# Patient Record
Sex: MALE | Race: WHITE | NOT HISPANIC OR LATINO | Employment: FULL TIME | URBAN - METROPOLITAN AREA
[De-identification: names, ages, dates, MRNs, and addresses within clinical notes are randomized per-mention and may not be internally consistent; named-entity substitution may affect disease eponyms.]

---

## 2023-11-06 ENCOUNTER — PHARMACY VISIT (OUTPATIENT)
Dept: PHARMACY | Facility: CLINIC | Age: 53
End: 2023-11-06
Payer: MEDICAID

## 2023-11-06 PROCEDURE — RXMED WILLOW AMBULATORY MEDICATION CHARGE

## 2023-12-14 ENCOUNTER — OFFICE VISIT (OUTPATIENT)
Dept: CARDIOLOGY | Facility: CLINIC | Age: 53
End: 2023-12-14
Payer: COMMERCIAL

## 2023-12-14 VITALS
DIASTOLIC BLOOD PRESSURE: 96 MMHG | SYSTOLIC BLOOD PRESSURE: 150 MMHG | WEIGHT: 308 LBS | OXYGEN SATURATION: 92 % | BODY MASS INDEX: 42.96 KG/M2 | HEART RATE: 85 BPM

## 2023-12-14 DIAGNOSIS — I82.432 ACUTE DEEP VEIN THROMBOSIS (DVT) OF POPLITEAL VEIN OF LEFT LOWER EXTREMITY (MULTI): Primary | ICD-10-CM

## 2023-12-14 PROCEDURE — 99214 OFFICE O/P EST MOD 30 MIN: CPT | Performed by: INTERNAL MEDICINE

## 2023-12-14 PROCEDURE — 1036F TOBACCO NON-USER: CPT | Performed by: INTERNAL MEDICINE

## 2023-12-14 RX ORDER — OMEPRAZOLE 20 MG/1
20 TABLET, DELAYED RELEASE ORAL 2 TIMES DAILY
COMMUNITY

## 2023-12-14 NOTE — PROGRESS NOTES
Chief Complaint:   DVT     History of Present Illness:    Valdez Madera is a 52 y/o man who follows up for unprovoked proximal DVT and PE in February 2022. He is on long-term anticoagulation with apixaban.     Recently developed worsening intermittent leg swelling and tightness after doing work where he was on his feet and moving up and down the stairs quite a lot. Since then notices that toward the end of the day he will have pretty significant indentation from where his socks dig into his leg. Swelling comes down overnight.    He denies chest pain and shortness of breath.    He denies bleeding including epistaxis, gingival bleeding, hemoptysis, hematemesis, hematochezia, melena, and hematuria.    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 He is overdue for screening colonoscopy.        Last Recorded Vitals:  Vitals:    12/14/23 0801   BP: (!) 150/96   BP Location: Left arm   Pulse: 85   SpO2: 92%   Weight: 140 kg (308 lb)           Social History:  He reports that he has never smoked. He has been exposed to tobacco smoke. He has never used smokeless tobacco. No history on file for alcohol use and drug use.        Outpatient Medications:  Current Outpatient Medications   Medication Instructions    apixaban (Eliquis) 5 mg tablet TAKE 1 TABLET BY MOUTH TWO TIMES A DAY    omeprazole OTC (PRILOSEC OTC) 20 mg, oral, 2 times daily, Do not crush, chew, or split.       Physical Exam:  No distress  No JVD or carotid bruits  Lungs clear bilaterally  Heart regular and without murmurs  Abdomen soft and non-tender  Trace left leg swelling  Pulses intact       Last Labs:  CBC -  Lab Results   Component Value Date    WBC 10.3 02/16/2022    HGB 12.9 (L) 02/16/2022    HCT 39.6 (L) 02/16/2022    MCV 86.5 02/16/2022     02/16/2022       CMP -  Lab Results   Component Value Date    CALCIUM 9.0 02/16/2022    PROT 6.8 02/11/2022    ALBUMIN 4.1 02/11/2022    ALBUMIN 4.4  "12/07/2021    AST 15 02/11/2022    ALT 15 02/11/2022    ALKPHOS 73 02/11/2022    BILITOT 1.0 02/11/2022       LIPID PANEL -   Lab Results   Component Value Date    CHOL 141 02/15/2022    TRIG 78 02/15/2022    HDL 46 02/15/2022    CHHDL 3.1 02/15/2022    LDLF 94 02/11/2022    VLDL 18 02/11/2022       RENAL FUNCTION PANEL -   Lab Results   Component Value Date    GLUCOSE 118 (H) 02/16/2022     02/16/2022    K 3.6 02/16/2022     02/16/2022    CO2 25 02/16/2022    ANIONGAP 11 02/16/2022    BUN 10 02/16/2022    CREATININE 0.6 02/16/2022    GFRMALE >90 02/11/2022    CALCIUM 9.0 02/16/2022    ALBUMIN 4.1 02/11/2022    ALBUMIN 4.4 12/07/2021        No results found for: \"BNP\", \"HGBA1C\"      Assessment/Plan   Diagnoses and all orders for this visit:  Acute deep vein thrombosis (DVT) of popliteal vein of left lower extremity (CMS/HCC)  -     Vascular US Lower Extremity Venous Duplex Left; Future  I want you to get scheduled for an ultrasound in the next couple of weeks at [facility]. You can call [phone] and select option 2 to schedule. However, I think you probably are having symptoms related to the old blood clot and not to something new.    I would recommend 20-30 mm Hg compression for your socks.    If you go through [employer], make sure you stick with major medical brands like: Juzo, Jobst, Mediven (or sometimes just Medi), Sigvaris.    There are also several websites that deal exclusively in medical-grade compression socks. I like [employer] and [employer].    Make sure you measure the widest part of your calf and the narrowest part of your ankle with a soft tape measure. You might try a sock with a wider band at the calf or a silicone gripper.    Do get scheduled for your colonoscopy. For colonoscopy, you will need to stop the Eliquis in the morning 2 days prior. If they take a biopsy, you can resume it the day after colonoscopy. If they do not take a biopsy, you can resume it that same day " in the evening.    Should you have questions, please do not hesitate to call my office at [phone].         Melvina Granado MD

## 2023-12-14 NOTE — PATIENT INSTRUCTIONS
I want you to get scheduled for an ultrasound in the next couple of weeks at [facility] [place]. You can call [phone] and select option 2 to schedule. However, I think you probably are having symptoms related to the old blood clot and not to something new.    I would recommend 20-30 mm Hg compression for your socks.    If you go through [employer], make sure you stick with major medical brands like: Juzo, Jobst, Mediven (or sometimes just Medi), Sigvaris.    There are also several websites that deal exclusively in medical-grade compression socks. I like [employer] and [employer].    Make sure you measure the widest part of your calf and the narrowest part of your ankle with a soft tape measure. You might try a sock with a wider band at the calf or a silicone gripper.    Do get scheduled for your colonoscopy. For colonoscopy, you will need to stop the Eliquis in the morning 2 days prior. If they take a biopsy, you can resume it the day after colonoscopy. If they do not take a biopsy, you can resume it that same day in the evening.    Should you have questions, please do not hesitate to call my office at [phone].

## 2024-01-03 ENCOUNTER — CLINICAL SUPPORT (OUTPATIENT)
Dept: VASCULAR MEDICINE | Facility: CLINIC | Age: 54
End: 2024-01-03
Payer: COMMERCIAL

## 2024-01-03 DIAGNOSIS — I82.432 ACUTE DEEP VEIN THROMBOSIS (DVT) OF POPLITEAL VEIN OF LEFT LOWER EXTREMITY (MULTI): ICD-10-CM

## 2024-01-03 PROCEDURE — 93971 EXTREMITY STUDY: CPT | Performed by: INTERNAL MEDICINE

## 2024-01-03 PROCEDURE — 93971 EXTREMITY STUDY: CPT

## 2024-01-31 ENCOUNTER — PHARMACY VISIT (OUTPATIENT)
Dept: PHARMACY | Facility: CLINIC | Age: 54
End: 2024-01-31
Payer: MEDICAID

## 2024-01-31 PROCEDURE — RXMED WILLOW AMBULATORY MEDICATION CHARGE

## 2024-03-06 ENCOUNTER — APPOINTMENT (OUTPATIENT)
Dept: CARDIOLOGY | Facility: HOSPITAL | Age: 54
End: 2024-03-06
Payer: COMMERCIAL

## 2024-04-22 ENCOUNTER — OFFICE VISIT (OUTPATIENT)
Dept: PRIMARY CARE | Facility: CLINIC | Age: 54
End: 2024-04-22
Payer: COMMERCIAL

## 2024-04-22 VITALS
SYSTOLIC BLOOD PRESSURE: 130 MMHG | DIASTOLIC BLOOD PRESSURE: 80 MMHG | HEIGHT: 71 IN | BODY MASS INDEX: 42 KG/M2 | WEIGHT: 300 LBS

## 2024-04-22 DIAGNOSIS — Z79.01 CURRENT USE OF LONG TERM ANTICOAGULATION: ICD-10-CM

## 2024-04-22 DIAGNOSIS — K60.2 RECTAL FISSURE: Primary | ICD-10-CM

## 2024-04-22 DIAGNOSIS — K64.9 ACUTE HEMORRHOID: ICD-10-CM

## 2024-04-22 DIAGNOSIS — E66.3 OVERWEIGHT: ICD-10-CM

## 2024-04-22 DIAGNOSIS — K21.9 GASTROESOPHAGEAL REFLUX DISEASE, UNSPECIFIED WHETHER ESOPHAGITIS PRESENT: ICD-10-CM

## 2024-04-22 DIAGNOSIS — Z87.19 HISTORY OF HEMORRHOIDS: ICD-10-CM

## 2024-04-22 DIAGNOSIS — L03.90 CELLULITIS, UNSPECIFIED CELLULITIS SITE: ICD-10-CM

## 2024-04-22 PROCEDURE — 99214 OFFICE O/P EST MOD 30 MIN: CPT | Performed by: INTERNAL MEDICINE

## 2024-04-22 RX ORDER — DOXYCYCLINE 100 MG/1
100 CAPSULE ORAL 2 TIMES DAILY
Qty: 20 CAPSULE | Refills: 0 | Status: SHIPPED | OUTPATIENT
Start: 2024-04-22 | End: 2024-05-02

## 2024-04-22 RX ORDER — BACITRACIN ZINC 500 UNIT/G
OINTMENT (GRAM) TOPICAL 2 TIMES DAILY
Qty: 14 G | Refills: 0 | Status: SHIPPED | OUTPATIENT
Start: 2024-04-22

## 2024-04-22 RX ORDER — AMOXICILLIN AND CLAVULANATE POTASSIUM 875; 125 MG/1; MG/1
875 TABLET, FILM COATED ORAL 2 TIMES DAILY
Qty: 20 TABLET | Refills: 0 | Status: SHIPPED | OUTPATIENT
Start: 2024-04-22 | End: 2024-05-02

## 2024-04-23 ENCOUNTER — OFFICE VISIT (OUTPATIENT)
Dept: SURGERY | Facility: CLINIC | Age: 54
End: 2024-04-23
Payer: COMMERCIAL

## 2024-04-23 VITALS
WEIGHT: 298 LBS | BODY MASS INDEX: 41.56 KG/M2 | SYSTOLIC BLOOD PRESSURE: 127 MMHG | DIASTOLIC BLOOD PRESSURE: 80 MMHG | HEART RATE: 108 BPM | TEMPERATURE: 98.6 F

## 2024-04-23 DIAGNOSIS — K61.0 PERIANAL ABSCESS: Primary | ICD-10-CM

## 2024-04-23 DIAGNOSIS — K64.9 ACUTE HEMORRHOID: ICD-10-CM

## 2024-04-23 PROCEDURE — 99203 OFFICE O/P NEW LOW 30 MIN: CPT | Performed by: SURGERY

## 2024-04-23 PROCEDURE — 99213 OFFICE O/P EST LOW 20 MIN: CPT | Performed by: SURGERY

## 2024-04-23 NOTE — PROGRESS NOTES
History Of Present Illness  Valdez Madera is a 53 y.o. male presents to the office for evaluation of rectal pain, swelling and bleeding.  Referred by Dr. Spivey.  Reports symptoms began approximately month ago discomfort with pressure that slowly progressed prompting an urgent care visit where he was prescribed antibiotics.  Symptoms continued until approximately Sunday there was drainage of bloody and purulent material with a release of his constant pressure.  Since then symptoms have slowly been abating.  Subjective fevers.  Tolerating diet and having soft formed stools.  No prior colonoscopy.  History of Smith's and has been told he needs a surveillance EGDs.  Past Medical History  DVT and PE in February 2022   GERD  Smith's esophagus    Surgical History       Social History  Smoking: Denies  ETOH:  Denies    Family History       Allergies  Patient has no known allergies.      Review of Systems  Constitutional: Negative for fever, chills, anorexia, weight loss, malaise     ENMT: Negative for nasal discharge, congestion, ear pain, mouth pain, throat pain     Respiratory: Negative for cough, hemoptysis, wheezing, shortness of breath     Cardiac: Negative for chest pain, dyspnea on exertion, orthopnea, palpitations, syncope     Gastrointestinal: Negative for nausea, vomiting, diarrhea, constipation, abdominal pain, (+)GERD, Barett's esophagus.    Genitourinary: Negative for discharge, dysuria, flank pain, frequency, hematuria     Musculoskeletal: Negative for decreased ROM, pain, swelling, weakness     Neurological: Negative for dizziness, confusion, headache, seizures, syncope     Psychiatric: Negative for mood changes, anxiety, hallucinations, sleep changes, suicidal ideas     Skin: Negative for mass, pain, itching, rash, ulcer     Endocrine: Negative for heat intolerance, cold intolerance, excessive sweating, polyuria, excess thirst     Hematologic/Lymph: Negative for anemia, bruising, easy bleeding, night  sweats, petechiae, history of DVT/PE or cancer , (+)DVT and PE in February 2022 on ANTICOAGULATION    Allergic/Immunologic: Negative for anaphylaxis, itchy/ teary eyes, itching, sneezing, swelling       Physical Exam  Constitutional:       Appearance: Normal appearance.   HENT:      Head: Normocephalic.   Eyes:      Pupils: Pupils are equal, round, and reactive to light.   Cardiovascular:      Rate and Rhythm: Normal rate.   Pulmonary:      Effort: Pulmonary effort is normal.   Abdominal:      General: Abdomen is flat. Bowel sounds are normal.      Palpations: Abdomen is soft.   Genitourinary:     Comments: Verbal consent was obtained and with chaperone present the patient was placed in prone Kraske position. Perianal skin was examined a fairly deep ulcerated wound in the right posterior quadrant measuring approximately 2 cm in length, well-circumscribed, bed of granulation tissue.  This was tracking a little bit more towards the coccyx than the anus it was about 2 cm away from the anal verge.  No external hemorrhoids identified.  Digital rectal exam revealed normal tone with good squeeze.  No palpable masses appreciated.  Anoscopy deferred   Skin:     General: Skin is warm.   Neurological:      General: No focal deficit present.      Mental Status: He is alert.                 Assessment/Plan   Problem List Items Addressed This Visit             ICD-10-CM       Gastrointestinal and Abdominal    Perianal abscess - Primary K61.0     Other Visit Diagnoses         Codes    Acute hemorrhoid     K64.9        History consistent with acute perianal abscess that underwent spontaneous decompression.  On exam there is granulation tissue and a well-circumscribed wound concerning for a more chronic process.  Discussed fiber supplementation to avoid discomfort with straining, ongoing sitz bath's, completing his previously prescribed antibiotic.  Discussed should this be a perianal abscess there is a chance of conversion to a  chronic fistula.  Follow-up in 1 month to assure appropriate wound healing.

## 2024-04-23 NOTE — PROGRESS NOTES
"Subjective   Patient ID: Valdez Madera is a 53 y.o. male who presents for rectal pain and swelling.    This is a 53-year-old gentleman today came here for pain and swelling in the rectal area, bleeding.  He was in the urgent care.  Pus came out.  He is concerned.  He is finishing antibiotic there.  He came here for follow-up.    I have personally reviewed the patient's Past Medical History, Medications, Allergies, Social History, and Family History in the EMR.    Review of Systems   All other systems reviewed and are negative.    Objective   /80   Ht 1.803 m (5' 11\")   Wt 136 kg (300 lb)   BMI 41.84 kg/m²     Physical Exam  Vitals reviewed.   Cardiovascular:      Heart sounds: Normal heart sounds, S1 normal and S2 normal. No murmur heard.     No friction rub.   Pulmonary:      Effort: Pulmonary effort is normal.      Breath sounds: Normal breath sounds and air entry.   Abdominal:      Palpations: There is no hepatomegaly, splenomegaly or mass.   Genitourinary:     Comments: About 4 o'clock position there is a rectal fissure as well as abscess.  Red, inflamed and tender.  Cellulitis present.  Musculoskeletal:      Right lower leg: No edema.      Left lower leg: No edema.   Lymphadenopathy:      Lower Body: No right inguinal adenopathy. No left inguinal adenopathy.   Neurological:      Cranial Nerves: Cranial nerves 2-12 are intact.      Sensory: No sensory deficit.      Motor: Motor function is intact.      Deep Tendon Reflexes: Reflexes are normal and symmetric.     LAB WORK:  Laboratory testing discussed.    Assessment/Plan   Problem List Items Addressed This Visit    None  Visit Diagnoses         Codes    Rectal fissure    -  Primary K60.2    Acute hemorrhoid     K64.9    Relevant Medications    amoxicillin-pot clavulanate (Augmentin) 875-125 mg tablet    doxycycline (Vibramycin) 100 mg capsule    bacitracin 500 unit/gram ointment    Other Relevant Orders    Referral to Colorectal Surgery    History of " hemorrhoids     Z87.19    Cellulitis, unspecified cellulitis site     L03.90    Overweight     E66.3    Current use of long term anticoagulation     Z79.01    Gastroesophageal reflux disease, unspecified whether esophagitis present     K21.9        1. Rectal fissure, oldest hemorrhoid and cellulitis.  Soak in a hot water.  Augmentin, doxycycline, local cleaning.  2. Overweight.  Diet and exercise.  Donut pillow.  Refer to the colorectal surgeon for treatment.  3. Anticoagulation, on Eliquis.  4. GERD, on PPI.  6. I shall see him back in a week or so    Scribe Attestation  By signing my name below, I, Wilber Hameed attest that this documentation has been prepared under the direction and in the presence of Barb Spivey MD.

## 2024-05-02 ENCOUNTER — OFFICE VISIT (OUTPATIENT)
Dept: PRIMARY CARE | Facility: CLINIC | Age: 54
End: 2024-05-02
Payer: COMMERCIAL

## 2024-05-02 ENCOUNTER — LAB (OUTPATIENT)
Dept: LAB | Facility: LAB | Age: 54
End: 2024-05-02
Payer: COMMERCIAL

## 2024-05-02 ENCOUNTER — HOSPITAL ENCOUNTER (OUTPATIENT)
Dept: RADIOLOGY | Facility: CLINIC | Age: 54
Discharge: HOME | End: 2024-05-02
Payer: COMMERCIAL

## 2024-05-02 VITALS
HEIGHT: 71 IN | WEIGHT: 307 LBS | BODY MASS INDEX: 42.98 KG/M2 | DIASTOLIC BLOOD PRESSURE: 88 MMHG | SYSTOLIC BLOOD PRESSURE: 140 MMHG

## 2024-05-02 DIAGNOSIS — M10.10 LEAD-INDUCED GOUT, UNSPECIFIED CHRONICITY, UNSPECIFIED SITE, INITIAL ENCOUNTER: ICD-10-CM

## 2024-05-02 DIAGNOSIS — M79.641 PAIN OF RIGHT HAND: ICD-10-CM

## 2024-05-02 DIAGNOSIS — M25.531 RIGHT WRIST PAIN: ICD-10-CM

## 2024-05-02 DIAGNOSIS — T56.0X1A LEAD-INDUCED GOUT, UNSPECIFIED CHRONICITY, UNSPECIFIED SITE, INITIAL ENCOUNTER: ICD-10-CM

## 2024-05-02 DIAGNOSIS — R53.83 OTHER FATIGUE: ICD-10-CM

## 2024-05-02 LAB
ALBUMIN SERPL BCP-MCNC: 4 G/DL (ref 3.4–5)
ALP SERPL-CCNC: 65 U/L (ref 33–120)
ALT SERPL W P-5'-P-CCNC: 16 U/L (ref 10–52)
ANION GAP SERPL CALC-SCNC: 12 MMOL/L (ref 10–20)
AST SERPL W P-5'-P-CCNC: 15 U/L (ref 9–39)
BILIRUB SERPL-MCNC: 1.5 MG/DL (ref 0–1.2)
BUN SERPL-MCNC: 16 MG/DL (ref 6–23)
CALCIUM SERPL-MCNC: 9.3 MG/DL (ref 8.6–10.6)
CHLORIDE SERPL-SCNC: 102 MMOL/L (ref 98–107)
CO2 SERPL-SCNC: 30 MMOL/L (ref 21–32)
CREAT SERPL-MCNC: 0.62 MG/DL (ref 0.5–1.3)
EGFRCR SERPLBLD CKD-EPI 2021: >90 ML/MIN/1.73M*2
ERYTHROCYTE [DISTWIDTH] IN BLOOD BY AUTOMATED COUNT: 13.4 % (ref 11.5–14.5)
ERYTHROCYTE [SEDIMENTATION RATE] IN BLOOD BY WESTERGREN METHOD: 29 MM/H (ref 0–20)
GLUCOSE SERPL-MCNC: 84 MG/DL (ref 74–99)
HCT VFR BLD AUTO: 43.1 % (ref 41–52)
HGB BLD-MCNC: 13.4 G/DL (ref 13.5–17.5)
MCH RBC QN AUTO: 28 PG (ref 26–34)
MCHC RBC AUTO-ENTMCNC: 31.1 G/DL (ref 32–36)
MCV RBC AUTO: 90 FL (ref 80–100)
NRBC BLD-RTO: 0 /100 WBCS (ref 0–0)
PLATELET # BLD AUTO: 394 X10*3/UL (ref 150–450)
POTASSIUM SERPL-SCNC: 4.3 MMOL/L (ref 3.5–5.3)
PROT SERPL-MCNC: 7.2 G/DL (ref 6.4–8.2)
RBC # BLD AUTO: 4.79 X10*6/UL (ref 4.5–5.9)
RHEUMATOID FACT SER NEPH-ACNC: <10 IU/ML (ref 0–15)
SODIUM SERPL-SCNC: 140 MMOL/L (ref 136–145)
URATE SERPL-MCNC: 4.4 MG/DL (ref 4–7.5)
WBC # BLD AUTO: 15.3 X10*3/UL (ref 4.4–11.3)

## 2024-05-02 PROCEDURE — 85652 RBC SED RATE AUTOMATED: CPT

## 2024-05-02 PROCEDURE — 86431 RHEUMATOID FACTOR QUANT: CPT

## 2024-05-02 PROCEDURE — 73110 X-RAY EXAM OF WRIST: CPT | Mod: RIGHT SIDE | Performed by: STUDENT IN AN ORGANIZED HEALTH CARE EDUCATION/TRAINING PROGRAM

## 2024-05-02 PROCEDURE — 80053 COMPREHEN METABOLIC PANEL: CPT

## 2024-05-02 PROCEDURE — 86038 ANTINUCLEAR ANTIBODIES: CPT

## 2024-05-02 PROCEDURE — 99214 OFFICE O/P EST MOD 30 MIN: CPT | Performed by: INTERNAL MEDICINE

## 2024-05-02 PROCEDURE — 84550 ASSAY OF BLOOD/URIC ACID: CPT

## 2024-05-02 PROCEDURE — 73130 X-RAY EXAM OF HAND: CPT | Mod: RIGHT SIDE | Performed by: STUDENT IN AN ORGANIZED HEALTH CARE EDUCATION/TRAINING PROGRAM

## 2024-05-02 PROCEDURE — 85027 COMPLETE CBC AUTOMATED: CPT

## 2024-05-02 PROCEDURE — 73110 X-RAY EXAM OF WRIST: CPT | Mod: RT

## 2024-05-02 PROCEDURE — 36415 COLL VENOUS BLD VENIPUNCTURE: CPT

## 2024-05-02 PROCEDURE — 73130 X-RAY EXAM OF HAND: CPT | Mod: RT

## 2024-05-02 RX ORDER — PREDNISONE 10 MG/1
TABLET ORAL 3 TIMES DAILY
Qty: 18 TABLET | Refills: 0 | Status: SHIPPED | OUTPATIENT
Start: 2024-05-02 | End: 2024-05-10

## 2024-05-02 RX ORDER — COLCHICINE 0.6 MG/1
0.6 TABLET ORAL 2 TIMES DAILY
Qty: 60 TABLET | Refills: 0 | Status: SHIPPED | OUTPATIENT
Start: 2024-05-02 | End: 2024-05-20 | Stop reason: SDUPTHER

## 2024-05-02 NOTE — PROGRESS NOTES
"Subjective   Patient ID: Valdez Madera is a 53 y.o. male who presents for right wrist and hand pain and swelling.    This gentleman today came here for pain and swelling in the right wrist and hand under his thumb mainly.  He came out of the blue.  No fall, trauma, injury.  No insect, cat bite.  It is inflamed.  He does not recall anything.  It happened two days ago, very inflamed for no good reason.    I have personally reviewed the patient's Past Medical History, Medications, Allergies, Social History, and Family History in the EMR.    Review of Systems   All other systems reviewed and are negative.    Objective   /88   Ht 1.803 m (5' 11\")   Wt 139 kg (307 lb)   BMI 42.82 kg/m²     Physical Exam  Vitals reviewed.   Cardiovascular:      Heart sounds: Normal heart sounds, S1 normal and S2 normal. No murmur heard.     No friction rub.   Pulmonary:      Effort: Pulmonary effort is normal.      Breath sounds: Normal breath sounds and air entry.   Abdominal:      Palpations: There is no hepatomegaly, splenomegaly or mass.   Musculoskeletal:      Right lower leg: No edema.      Left lower leg: No edema.      Comments: Right hand wrist below thumb there is appreciable swelling, very tender.  Movements painful.  He had a tough time in touching tip of the thumb to the tip of little finger.  Pulses okay.   Lymphadenopathy:      Lower Body: No right inguinal adenopathy. No left inguinal adenopathy.   Neurological:      Cranial Nerves: Cranial nerves 2-12 are intact.      Sensory: No sensory deficit.      Motor: Motor function is intact.      Deep Tendon Reflexes: Reflexes are normal and symmetric.     LAB WORK:  Laboratory testing discussed.    Assessment/Plan   Problem List Items Addressed This Visit    None  Visit Diagnoses         Codes    Pain of right hand     M79.641    Relevant Medications    predniSONE (Deltasone) 10 mg tablet    colchicine 0.6 mg tablet    Other Relevant Orders    XR hand right 3+ views    " Arthritis Panel (CMS)    Right wrist pain     M25.531    Relevant Medications    predniSONE (Deltasone) 10 mg tablet    colchicine 0.6 mg tablet    Other Relevant Orders    XR wrist right 3+ views    Arthritis Panel (CMS)    Other fatigue     R53.83    Relevant Orders    CBC    Comprehensive metabolic panel    Lead-induced gout, unspecified chronicity, unspecified site, initial encounter     T56.0X1A, M10.10    Relevant Orders    Uric acid        1. Right wrist and hand pain.  Clinically it looks like gout to me.  I ordered an x-ray.  There is no reason to believe it is a fracture.  I do not think it is cellulitis.  X-ray ordered.  Blood count, arthritis panel.  I put him on colchicine, prednisone.  We cannot do anti-inflammatory.  Splint given.  2. I shall see him back on Monday.  3. If situation gets worse, he will call me right away.  4. I ordered x-ray.  5. No need for hospitalization at the moment.    Scribe Attestation  By signing my name below, IAraceli Scribe attest that this documentation has been prepared under the direction and in the presence of Barb Spivey MD.

## 2024-05-09 ENCOUNTER — PHARMACY VISIT (OUTPATIENT)
Dept: PHARMACY | Facility: CLINIC | Age: 54
End: 2024-05-09
Payer: MEDICAID

## 2024-05-09 PROCEDURE — RXMED WILLOW AMBULATORY MEDICATION CHARGE

## 2024-05-10 LAB — ANA SER QL HEP2 SUBST: NEGATIVE

## 2024-05-13 NOTE — PROGRESS NOTES
"[facility]: GENERAL SURGERY  PROGRESS NOTE      Valdez Madera is a 53 y.o. male that is presenting today for No chief complaint on file..    ASSESSMENT / PLAN:  There are no diagnoses linked to this encounter.  Patient Active Problem List   Diagnosis    Perianal abscess     Subjective   Perianal pain.  Seen in office last month.  Prior exam:  Perianal skin was examined a fairly deep ulcerated wound in the right posterior quadrant measuring approximately 2 cm in length, well-circumscribed, bed of granulation tissue. This was tracking a little bit more towards the coccyx than the anus it was about 2 cm away from the anal verge. No external hemorrhoids identified. Digital rectal exam revealed normal tone with good squeeze. No palpable masses appreciated. Anoscopy deferred     Following up for wound check.    Review of Systems   Objective   There were no vitals filed for this visit.   Physical Exam    Diagnostic Results   Lab Results   Component Value Date    GLUCOSE 84 05/02/2024    CALCIUM 9.3 05/02/2024     05/02/2024    K 4.3 05/02/2024    CO2 30 05/02/2024     05/02/2024    BUN 16 05/02/2024    CREATININE 0.62 05/02/2024     Lab Results   Component Value Date    ALT 16 05/02/2024    AST 15 05/02/2024    ALKPHOS 65 05/02/2024    BILITOT 1.5 (H) 05/02/2024     Lab Results   Component Value Date    WBC 15.3 (H) 05/02/2024    HGB 13.4 (L) 05/02/2024    HCT 43.1 05/02/2024    MCV 90 05/02/2024     05/02/2024     Lab Results   Component Value Date    CHOL 141 02/15/2022    CHOL 154 02/11/2022     Lab Results   Component Value Date    HDL 46 02/15/2022    HDL 41.5 02/11/2022     Lab Results   Component Value Date    LDLCALC 79 02/15/2022     Lab Results   Component Value Date    TRIG 78 02/15/2022    TRIG 92 02/11/2022     No components found for: \"CHOLHDL\"  No results found for: \"HGBA1C\"  Other labs not included in the list above were reviewed either before or during this " encounter.    History    No past medical history on file.  No past surgical history on file.  No family history on file.  No Known Allergies  Current Outpatient Medications on File Prior to Visit   Medication Sig Dispense Refill    apixaban (Eliquis) 5 mg tablet TAKE 1 TABLET BY MOUTH TWO TIMES A  tablet 3    bacitracin 500 unit/gram ointment Apply topically 2 times a day. 14 g 0    colchicine 0.6 mg tablet Take 1 tablet (0.6 mg) by mouth 2 times a day. 60 tablet 0    omeprazole OTC (PriLOSEC OTC) 20 mg EC tablet Take 1 tablet (20 mg) by mouth 2 times a day. Do not crush, chew, or split.      [] predniSONE (Deltasone) 10 mg tablet Take 1 tablet (10 mg) by mouth 3 times a day for 3 days, THEN 1 tablet (10 mg) 2 times a day for 3 days, THEN 1 tablet (10 mg) once daily for 3 days. 18 tablet 0     No current facility-administered medications on file prior to visit.     Immunization History   Administered Date(s) Administered    Moderna COVID-19 vaccine, bivalent, blue cap/gray label *Check age/dose* 2022    Moderna SARS-CoV-2 Vaccination 2021, 2021, 12/10/2021, 06/10/2022     Patient's medical history was reviewed and updated either before or during this encounter.    Francis Zayas MD

## 2024-05-17 ENCOUNTER — APPOINTMENT (OUTPATIENT)
Dept: SURGERY | Facility: CLINIC | Age: 54
End: 2024-05-17
Payer: COMMERCIAL

## 2024-05-20 ENCOUNTER — OFFICE VISIT (OUTPATIENT)
Dept: PRIMARY CARE | Facility: CLINIC | Age: 54
End: 2024-05-20
Payer: COMMERCIAL

## 2024-05-20 VITALS
DIASTOLIC BLOOD PRESSURE: 72 MMHG | BODY MASS INDEX: 42.28 KG/M2 | SYSTOLIC BLOOD PRESSURE: 138 MMHG | WEIGHT: 302 LBS | HEIGHT: 71 IN

## 2024-05-20 DIAGNOSIS — K21.9 GASTROESOPHAGEAL REFLUX DISEASE, UNSPECIFIED WHETHER ESOPHAGITIS PRESENT: ICD-10-CM

## 2024-05-20 DIAGNOSIS — M11.20 PSEUDOGOUT: Primary | ICD-10-CM

## 2024-05-20 DIAGNOSIS — M25.531 RIGHT WRIST PAIN: ICD-10-CM

## 2024-05-20 DIAGNOSIS — M79.641 PAIN OF RIGHT HAND: ICD-10-CM

## 2024-05-20 DIAGNOSIS — Z79.01 CURRENT USE OF LONG TERM ANTICOAGULATION: ICD-10-CM

## 2024-05-20 DIAGNOSIS — M10.9 GOUT OF MULTIPLE SITES, UNSPECIFIED CAUSE, UNSPECIFIED CHRONICITY: ICD-10-CM

## 2024-05-20 DIAGNOSIS — G47.30 SLEEP APNEA, UNSPECIFIED TYPE: ICD-10-CM

## 2024-05-20 PROCEDURE — 99214 OFFICE O/P EST MOD 30 MIN: CPT | Performed by: INTERNAL MEDICINE

## 2024-05-20 RX ORDER — COLCHICINE 0.6 MG/1
0.6 TABLET ORAL 2 TIMES DAILY
Qty: 60 TABLET | Refills: 0 | Status: SHIPPED | OUTPATIENT
Start: 2024-05-20 | End: 2024-05-21

## 2024-05-20 ASSESSMENT — ENCOUNTER SYMPTOMS
LOSS OF SENSATION IN FEET: 0
OCCASIONAL FEELINGS OF UNSTEADINESS: 0
DEPRESSION: 0

## 2024-05-21 NOTE — PROGRESS NOTES
"Subjective   Patient ID: Valdez Madera is a 53 y.o. male who presents for right wrist pain.     This gentleman today came here for right wrist pain.  It is still there, not completely gone.  He has an x-ray taken.  Appetite and weight are okay.  No problem.  He came here for follow-up.    I have personally reviewed the patient's Past Medical History, Medications, Allergies, Social History, and Family History in the EMR.    Review of Systems   All other systems reviewed and are negative.    Objective   /72   Ht 1.803 m (5' 11\")   Wt 137 kg (302 lb)   BMI 42.12 kg/m²     Physical Exam  Vitals reviewed.   Cardiovascular:      Heart sounds: Normal heart sounds, S1 normal and S2 normal. No murmur heard.     No friction rub.   Pulmonary:      Effort: Pulmonary effort is normal.      Breath sounds: Normal breath sounds and air entry.   Abdominal:      Palpations: There is no hepatomegaly, splenomegaly or mass.   Musculoskeletal:      Right wrist: Swelling and tenderness present.      Right lower leg: No edema.      Left lower leg: No edema.      Comments: Right wrist movements painful.   Lymphadenopathy:      Lower Body: No right inguinal adenopathy. No left inguinal adenopathy.   Neurological:      Cranial Nerves: Cranial nerves 2-12 are intact.      Sensory: No sensory deficit.      Motor: Motor function is intact.      Deep Tendon Reflexes: Reflexes are normal and symmetric.     LAB WORK:  Laboratory testing discussed.    Assessment/Plan   Problem List Items Addressed This Visit    None  Visit Diagnoses         Codes    Pseudogout    -  Primary M11.20    Gout of multiple sites, unspecified cause, unspecified chronicity     M10.9    Relevant Orders    Referral to Rheumatology    Pain of right hand     M79.641    Right wrist pain     M25.531    Sleep apnea, unspecified type     G47.30    Gastroesophageal reflux disease, unspecified whether esophagitis present     K21.9    Current use of long term anticoagulation "     Z79.01        1. Right wrist pain, pseudogout.  Continue colchicine.  Refer to rheumatologist.  2. Sleep apnea, on CPAP.  3. GERD, on PPI.  4. Anticoagulation.  He is on Eliquis.  Lifetime anticoagulation.  Continue Eliquis.  Monitor kidney.  5. Follow up in four to six weeks.    Scribe Attestation  By signing my name below, Araceli MARTINEZ Scribe attest that this documentation has been prepared under the direction and in the presence of Barb Spivey MD.

## 2024-05-22 NOTE — PROGRESS NOTES
[facility]: GENERAL SURGERY  PROGRESS NOTE      Valdez Madera is a 53 y.o. male that is presenting today for Follow-up (Here for perianal abscess. No drainage. Some slight discomfort. Sitting for long periods of time causes pain. No bleeding or odor. Never had a colonoscopy. ).    ASSESSMENT / PLAN:  Diagnoses and all orders for this visit:  Perianal abscess  Resolved, follow up as needed.  Patient Active Problem List   Diagnosis    Perianal abscess     Subjective   Seen last month, 2 cm ulcerated wound in right posterior quadrant 2 cm from the anal verge.  Presumed abscess with spontaneous decompression.  Presents for follow-up, no issues.  Hx of barretts, going to see GI for screening colonoscopy  Review of Systems   Constitutional: Negative.    HENT: Negative.     Eyes: Negative.    Respiratory: Negative.     Cardiovascular: Negative.    Gastrointestinal: Negative.    Genitourinary: Negative.    Skin: Negative.    All other systems reviewed and are negative.     Objective   Vitals:    05/23/24 1341   BP: (!) 159/109   Pulse: 109   Temp: 37.9 °C (100.2 °F)   SpO2: 94%      Physical Exam  Constitutional:       Appearance: Normal appearance.   HENT:      Head: Normocephalic.   Eyes:      Pupils: Pupils are equal, round, and reactive to light.   Cardiovascular:      Rate and Rhythm: Normal rate.   Pulmonary:      Effort: Pulmonary effort is normal.   Abdominal:      General: Abdomen is flat. Bowel sounds are normal.      Palpations: Abdomen is soft.   Skin:     General: Skin is warm.   Neurological:      General: No focal deficit present.      Mental Status: He is alert.         Diagnostic Results   Lab Results   Component Value Date    GLUCOSE 84 05/02/2024    CALCIUM 9.3 05/02/2024     05/02/2024    K 4.3 05/02/2024    CO2 30 05/02/2024     05/02/2024    BUN 16 05/02/2024    CREATININE 0.62 05/02/2024     Lab Results   Component Value Date    ALT 16 05/02/2024    AST 15 05/02/2024    ALKPHOS  "65 05/02/2024    BILITOT 1.5 (H) 05/02/2024     Lab Results   Component Value Date    WBC 15.3 (H) 05/02/2024    HGB 13.4 (L) 05/02/2024    HCT 43.1 05/02/2024    MCV 90 05/02/2024     05/02/2024     Lab Results   Component Value Date    CHOL 141 02/15/2022    CHOL 154 02/11/2022     Lab Results   Component Value Date    HDL 46 02/15/2022    HDL 41.5 02/11/2022     Lab Results   Component Value Date    LDLCALC 79 02/15/2022     Lab Results   Component Value Date    TRIG 78 02/15/2022    TRIG 92 02/11/2022     No components found for: \"CHOLHDL\"  No results found for: \"HGBA1C\"  Other labs not included in the list above were reviewed either before or during this encounter.    History    Past Medical History:   Diagnosis Date    Fissure, anal 4/22/24    Gout     Inguinal hernia recurrent bilateral 1990    Perianal abscess     Sleep apnea      Past Surgical History:   Procedure Laterality Date    HERNIA REPAIR  1997     Family History   Problem Relation Name Age of Onset    Dementia Mother      Cancer Father Dad     Alcohol abuse Sister      Alcohol abuse Brother          37 age of death    No Known Problems Maternal Grandmother      No Known Problems Maternal Grandfather      No Known Problems Paternal Grandmother      No Known Problems Paternal Grandfather       No Known Allergies  Current Outpatient Medications on File Prior to Visit   Medication Sig Dispense Refill    apixaban (Eliquis) 5 mg tablet TAKE 1 TABLET BY MOUTH TWO TIMES A  tablet 3    bacitracin 500 unit/gram ointment Apply topically 2 times a day. 14 g 0    colchicine 0.6 mg tablet TAKE 1 TABLET (0.6 MG) BY MOUTH 2 TIMES A DAY. 60 tablet 0    omeprazole OTC (PriLOSEC OTC) 20 mg EC tablet Take 1 tablet (20 mg) by mouth 2 times a day. Do not crush, chew, or split.      [DISCONTINUED] colchicine 0.6 mg tablet Take 1 tablet (0.6 mg) by mouth 2 times a day. 60 tablet 0    [DISCONTINUED] colchicine 0.6 mg tablet Take 1 tablet (0.6 mg) by mouth 2 " times a day. 60 tablet 0     No current facility-administered medications on file prior to visit.     Immunization History   Administered Date(s) Administered    Moderna COVID-19 vaccine, bivalent, blue cap/gray label *Check age/dose* 11/11/2022    Moderna SARS-CoV-2 Vaccination 03/22/2021, 04/19/2021, 12/10/2021, 06/10/2022     Patient's medical history was reviewed and updated either before or during this encounter.    Francis Zayas MD

## 2024-05-23 ENCOUNTER — OFFICE VISIT (OUTPATIENT)
Dept: SURGERY | Facility: CLINIC | Age: 54
End: 2024-05-23
Payer: COMMERCIAL

## 2024-05-23 VITALS
OXYGEN SATURATION: 94 % | WEIGHT: 300.1 LBS | TEMPERATURE: 100.2 F | HEART RATE: 109 BPM | BODY MASS INDEX: 42.01 KG/M2 | DIASTOLIC BLOOD PRESSURE: 109 MMHG | SYSTOLIC BLOOD PRESSURE: 159 MMHG | HEIGHT: 71 IN

## 2024-05-23 DIAGNOSIS — K61.0 PERIANAL ABSCESS: Primary | ICD-10-CM

## 2024-05-23 PROCEDURE — 1036F TOBACCO NON-USER: CPT | Performed by: SURGERY

## 2024-05-23 PROCEDURE — 99213 OFFICE O/P EST LOW 20 MIN: CPT | Performed by: SURGERY

## 2024-05-23 ASSESSMENT — ENCOUNTER SYMPTOMS
OCCASIONAL FEELINGS OF UNSTEADINESS: 0
DEPRESSION: 0
RESPIRATORY NEGATIVE: 1
GASTROINTESTINAL NEGATIVE: 1
EYES NEGATIVE: 1
LOSS OF SENSATION IN FEET: 0
CONSTITUTIONAL NEGATIVE: 1
CARDIOVASCULAR NEGATIVE: 1

## 2024-05-23 ASSESSMENT — PATIENT HEALTH QUESTIONNAIRE - PHQ9
2. FEELING DOWN, DEPRESSED OR HOPELESS: NOT AT ALL
SUM OF ALL RESPONSES TO PHQ9 QUESTIONS 1 & 2: 0
1. LITTLE INTEREST OR PLEASURE IN DOING THINGS: NOT AT ALL

## 2024-05-23 ASSESSMENT — COLUMBIA-SUICIDE SEVERITY RATING SCALE - C-SSRS
6. HAVE YOU EVER DONE ANYTHING, STARTED TO DO ANYTHING, OR PREPARED TO DO ANYTHING TO END YOUR LIFE?: NO
2. HAVE YOU ACTUALLY HAD ANY THOUGHTS OF KILLING YOURSELF?: NO
1. IN THE PAST MONTH, HAVE YOU WISHED YOU WERE DEAD OR WISHED YOU COULD GO TO SLEEP AND NOT WAKE UP?: NO

## 2024-05-23 ASSESSMENT — PAIN SCALES - GENERAL: PAINLEVEL: 0-NO PAIN

## 2024-05-31 ENCOUNTER — APPOINTMENT (OUTPATIENT)
Dept: SURGERY | Facility: CLINIC | Age: 54
End: 2024-05-31
Payer: COMMERCIAL

## 2024-06-12 ENCOUNTER — OFFICE VISIT (OUTPATIENT)
Dept: RHEUMATOLOGY | Facility: CLINIC | Age: 54
End: 2024-06-12
Payer: COMMERCIAL

## 2024-06-12 VITALS — BODY MASS INDEX: 42.12 KG/M2 | DIASTOLIC BLOOD PRESSURE: 84 MMHG | SYSTOLIC BLOOD PRESSURE: 120 MMHG | WEIGHT: 302 LBS

## 2024-06-12 DIAGNOSIS — M10.9 GOUT OF MULTIPLE SITES, UNSPECIFIED CAUSE, UNSPECIFIED CHRONICITY: ICD-10-CM

## 2024-06-12 DIAGNOSIS — M19.90 ARTHRITIS: Primary | ICD-10-CM

## 2024-06-12 PROCEDURE — 99204 OFFICE O/P NEW MOD 45 MIN: CPT | Performed by: INTERNAL MEDICINE

## 2024-06-12 PROCEDURE — 99214 OFFICE O/P EST MOD 30 MIN: CPT | Performed by: INTERNAL MEDICINE

## 2024-06-12 NOTE — PROGRESS NOTES
NP ref per Dr. WERNER Spivey for evaluation and treatment of [id number].  Wrist / hand pain and swelling.  Eval with Dr. Spivey 5-20, steroid / colchicine bid treament with good relief.   Neg x-ray / labs with normal results.    HPI - He is here with R wrist pain.  He thinks he hurt it 6 mo ago when playing tug of war with his dog, but the sx resolved.  Recently, he developed R hand pain/swelling without any injury.  It was very hot, painful, and swollen.  He saw his primary who gave him prednisone and colchicine.  The pain has not completely resolved.  He hand numbness when this was going on, but this has resolved.  He has had tingling in the past that resolved spont - he has never seen anybody about it.  He had labs/x-rays and was dx with pseudogout.  He occ will have pain in wrists or elbows with overuse.  He gets knee pain - he saw ortho and had injection which didn't help as much as he would like but did help some.  It was approx 3-4 mo ago.  He has a h/o LLE DVT and PE - I do not see labs for hypercoag workup.  No CP.  +OLIVA.   No GI.  He is eating healthier and has lost 15 lbs.  No fever.  Occ HA.  Some dry mouth - thinks he doesn't drink enough water.  No mouth sores, raynauds, or rash    FH - ?arthritis.  No CTD  SH - nonsmoker.  No EtOH.  No marijuana/drugs.  Has occ tried gummy in past    PE  Gen - NAD  HEENT - moist MM good saliv pooling (is chewing gum).  PERRL  Neck - supple, no LAD  CV - RRR no r/m/g  Lungs - CTA  Abd - +BS  Extr - 2+ DP, PT, and rad pulses.  No c/c/e  Skin - no rash.  No nail changes  Psych - anxious/aggravated affect  Neuro - nl strength.  Reflexes 1+ symmetric  Msk - no synovitis.  Minimal tenderness R wrist.  Few tiny heberdons.  NT and no pain with ROM throughout    A/P - Pt with OA with sudden onset of R wrist and hand pain/swelling that nearly resolved with steroids  X-ray showed chrondrocalcinosis R wrist.  Crystal arthritis is in ddx, as are other forms of inflam arthritis.  Pt to  call with any flares to eval  TONEY, RF, uric acid nl.  ESR mil incr at 29 (ULN for age is 27)  No NSAIDs d/t eliquis (h/o DVT/PE).  Continue colchicine  He declined wrist injection  He has a follow up with ortho about his knee

## 2024-06-25 DIAGNOSIS — M25.531 RIGHT WRIST PAIN: ICD-10-CM

## 2024-06-25 DIAGNOSIS — M79.641 PAIN OF RIGHT HAND: ICD-10-CM

## 2024-06-25 RX ORDER — COLCHICINE 0.6 MG/1
0.6 TABLET ORAL 2 TIMES DAILY
Qty: 60 TABLET | Refills: 0 | Status: SHIPPED | OUTPATIENT
Start: 2024-06-25 | End: 2024-07-25

## 2024-07-22 ENCOUNTER — APPOINTMENT (OUTPATIENT)
Dept: PRIMARY CARE | Facility: CLINIC | Age: 54
End: 2024-07-22
Payer: COMMERCIAL

## 2024-07-29 ENCOUNTER — TELEPHONE (OUTPATIENT)
Dept: CARDIOLOGY | Facility: HOSPITAL | Age: 54
End: 2024-07-29
Payer: COMMERCIAL

## 2024-07-29 ENCOUNTER — APPOINTMENT (OUTPATIENT)
Dept: RHEUMATOLOGY | Facility: CLINIC | Age: 54
End: 2024-07-29
Payer: COMMERCIAL

## 2024-07-29 DIAGNOSIS — M79.641 PAIN OF RIGHT HAND: ICD-10-CM

## 2024-07-29 DIAGNOSIS — M25.531 RIGHT WRIST PAIN: ICD-10-CM

## 2024-07-29 RX ORDER — COLCHICINE 0.6 MG/1
0.6 TABLET ORAL 2 TIMES DAILY
Qty: 60 TABLET | Refills: 1 | Status: SHIPPED | OUTPATIENT
Start: 2024-07-29 | End: 2024-07-31 | Stop reason: SDUPTHER

## 2024-07-30 DIAGNOSIS — I82.432 ACUTE DEEP VEIN THROMBOSIS (DVT) OF POPLITEAL VEIN OF LEFT LOWER EXTREMITY (MULTI): ICD-10-CM

## 2024-07-31 DIAGNOSIS — M79.641 PAIN OF RIGHT HAND: ICD-10-CM

## 2024-07-31 DIAGNOSIS — M25.531 RIGHT WRIST PAIN: ICD-10-CM

## 2024-07-31 RX ORDER — COLCHICINE 0.6 MG/1
0.6 TABLET ORAL 2 TIMES DAILY
Qty: 60 TABLET | Refills: 2 | Status: SHIPPED | OUTPATIENT
Start: 2024-07-31 | End: 2024-09-29

## 2024-08-27 ENCOUNTER — PHARMACY VISIT (OUTPATIENT)
Dept: PHARMACY | Facility: CLINIC | Age: 54
End: 2024-08-27
Payer: MEDICAID

## 2024-08-27 PROCEDURE — RXMED WILLOW AMBULATORY MEDICATION CHARGE

## 2024-09-17 ENCOUNTER — OFFICE VISIT (OUTPATIENT)
Dept: RHEUMATOLOGY | Facility: CLINIC | Age: 54
End: 2024-09-17
Payer: COMMERCIAL

## 2024-09-17 VITALS — WEIGHT: 302 LBS | DIASTOLIC BLOOD PRESSURE: 96 MMHG | SYSTOLIC BLOOD PRESSURE: 148 MMHG | BODY MASS INDEX: 42.12 KG/M2

## 2024-09-17 DIAGNOSIS — M79.641 PAIN OF RIGHT HAND: ICD-10-CM

## 2024-09-17 DIAGNOSIS — M19.90 ARTHRITIS: ICD-10-CM

## 2024-09-17 DIAGNOSIS — M25.531 RIGHT WRIST PAIN: ICD-10-CM

## 2024-09-17 PROCEDURE — 99214 OFFICE O/P EST MOD 30 MIN: CPT | Performed by: INTERNAL MEDICINE

## 2024-09-17 RX ORDER — COLCHICINE 0.6 MG/1
0.6 TABLET ORAL 2 TIMES DAILY
Qty: 180 TABLET | Refills: 1 | Status: SHIPPED | OUTPATIENT
Start: 2024-09-17 | End: 2025-03-16

## 2024-09-17 NOTE — PROGRESS NOTES
Recheck  OA   Doing well overall.  Pain off and on    HPI - he has not had any flares on colchicine.  He does get some R hand pain, but it's not as bad as it was.  He gets knee injections from ortho on occasion.   Back stiff in AM.  No CP, SOB, or GI.      PE  NAD  RRR no r/m/g  CTA  No edema  No synovitis  NT and no pain with ROM throughout    A/P - OA with acute inflam flare in R wrist poss d/t CPPD, doing well with colchicine  He will call if sx flare   Follow up yearly or sooner PRN

## 2024-09-18 ENCOUNTER — APPOINTMENT (OUTPATIENT)
Dept: RHEUMATOLOGY | Facility: CLINIC | Age: 54
End: 2024-09-18
Payer: COMMERCIAL

## 2024-09-27 ENCOUNTER — PHARMACY VISIT (OUTPATIENT)
Dept: PHARMACY | Facility: CLINIC | Age: 54
End: 2024-09-27
Payer: MEDICAID

## 2024-09-27 PROCEDURE — RXMED WILLOW AMBULATORY MEDICATION CHARGE

## 2024-10-22 ENCOUNTER — PHARMACY VISIT (OUTPATIENT)
Dept: PHARMACY | Facility: CLINIC | Age: 54
End: 2024-10-22
Payer: MEDICAID

## 2024-10-22 PROCEDURE — RXMED WILLOW AMBULATORY MEDICATION CHARGE

## 2024-11-06 PROCEDURE — RXMED WILLOW AMBULATORY MEDICATION CHARGE

## 2024-11-07 ENCOUNTER — PHARMACY VISIT (OUTPATIENT)
Dept: PHARMACY | Facility: CLINIC | Age: 54
End: 2024-11-07
Payer: MEDICAID

## 2024-11-19 ENCOUNTER — PHARMACY VISIT (OUTPATIENT)
Dept: PHARMACY | Facility: CLINIC | Age: 54
End: 2024-11-19
Payer: MEDICAID

## 2024-11-19 PROCEDURE — RXMED WILLOW AMBULATORY MEDICATION CHARGE

## 2024-12-19 ENCOUNTER — PHARMACY VISIT (OUTPATIENT)
Dept: PHARMACY | Facility: CLINIC | Age: 54
End: 2024-12-19
Payer: MEDICAID

## 2024-12-19 PROCEDURE — RXMED WILLOW AMBULATORY MEDICATION CHARGE

## 2024-12-27 ENCOUNTER — OFFICE VISIT (OUTPATIENT)
Dept: URGENT CARE | Age: 54
End: 2024-12-27
Payer: COMMERCIAL

## 2024-12-27 ENCOUNTER — PHARMACY VISIT (OUTPATIENT)
Dept: PHARMACY | Facility: CLINIC | Age: 54
End: 2024-12-27
Payer: COMMERCIAL

## 2024-12-27 VITALS
RESPIRATION RATE: 20 BRPM | HEART RATE: 88 BPM | DIASTOLIC BLOOD PRESSURE: 90 MMHG | OXYGEN SATURATION: 97 % | SYSTOLIC BLOOD PRESSURE: 154 MMHG | TEMPERATURE: 97.7 F

## 2024-12-27 DIAGNOSIS — L30.9 DERMATITIS: Primary | ICD-10-CM

## 2024-12-27 DIAGNOSIS — J06.9 UPPER RESPIRATORY TRACT INFECTION, UNSPECIFIED TYPE: ICD-10-CM

## 2024-12-27 PROCEDURE — RXOTC WILLOW AMBULATORY OTC CHARGE

## 2024-12-27 PROCEDURE — RXMED WILLOW AMBULATORY MEDICATION CHARGE

## 2024-12-27 RX ORDER — PREDNISONE 20 MG/1
40 TABLET ORAL DAILY
Qty: 10 TABLET | Refills: 0 | Status: SHIPPED | OUTPATIENT
Start: 2024-12-27 | End: 2025-01-01

## 2024-12-27 NOTE — PROGRESS NOTES
Subjective   Patient ID: Valdez Madera is a 54 y.o. male. They present today with a chief complaint of Rash (Around both eyes- few weeks ).    History of Present Illness  Reports red, dry, itchy, painful skin around both eyes x1 week worse after applying Neosporin. Denies vision changes, drainage, other rash. Reports nasal congestion x1 week also. States his family members have had similar symptoms x1 week. Denies fever, CP, SOB.      Rash        Past Medical History  Allergies as of 12/27/2024    (No Known Allergies)       (Not in a hospital admission)       Past Medical History:   Diagnosis Date    Fissure, anal 4/22/24    Gout     Inguinal hernia recurrent bilateral 1990    Perianal abscess     Sleep apnea        Past Surgical History:   Procedure Laterality Date    HERNIA REPAIR  1997        reports that he has never smoked. He has been exposed to tobacco smoke. He has never used smokeless tobacco. He reports current alcohol use. He reports that he does not use drugs.    Review of Systems  Pertinent systems reviewed and were negative unless otherwise stated in HPI.    Objective    Vitals:    12/27/24 1115   BP: 154/90   Pulse: 88   Resp: 20   Temp: 36.5 °C (97.7 °F)   SpO2: 97%     No LMP for male patient.    Physical Exam  Constitutional:       General: He is not in acute distress.  HENT:      Right Ear: Tympanic membrane, ear canal and external ear normal.      Left Ear: Tympanic membrane, ear canal and external ear normal.      Nose: No congestion.      Mouth/Throat:      Mouth: Mucous membranes are moist.      Pharynx: No oropharyngeal exudate or posterior oropharyngeal erythema.   Eyes:      General:         Right eye: No discharge.         Left eye: No discharge.      Extraocular Movements: Extraocular movements intact.      Conjunctiva/sclera: Conjunctivae normal.   Cardiovascular:      Rate and Rhythm: Normal rate and regular rhythm.   Pulmonary:      Effort: Pulmonary effort is normal. No respiratory  distress.   Skin:     Findings: No rash (dry, erythematous erythema surrounding both eyes. No tenderness, edema).         Diagnostic study results (if any) were reviewed by Santiago Madera PA-C.    Assessment/Plan   Allergies, medications, history, and pertinent labs/EKGs/imaging reviewed by Santiago Madera PA-C.     Medical Decision Making  Low concern for cellulitis, shingles. Likely dermatitis from topical antibiotic. Advised discontinuing NeoSporin.     Orders and Diagnoses  Diagnoses and all orders for this visit:  Dermatitis  -     predniSONE (Deltasone) 20 mg tablet; Take 2 tablets (40 mg) by mouth once daily for 5 days.  Upper respiratory tract infection, unspecified type      Medical Admin Record      Disposition: Home    Electronically signed by Santiago Madera PA-C

## 2024-12-27 NOTE — PATIENT INSTRUCTIONS
I recommend Zyrtec (cetirizine) as directed for the next 24 hours.    If not improving, start prednisone as prescribed.    I recommend humidified air and nasal saline for congestion.    Please follow up with your primary provider within one week if symptoms do not improve.  You may schedule an appointment online at [email or web address] or call [phone]. Go to the Emergency Department if symptoms significantly worsen

## 2025-01-12 PROCEDURE — RXMED WILLOW AMBULATORY MEDICATION CHARGE

## 2025-01-13 ENCOUNTER — PHARMACY VISIT (OUTPATIENT)
Dept: PHARMACY | Facility: CLINIC | Age: 55
End: 2025-01-13
Payer: MEDICAID

## 2025-01-22 ENCOUNTER — OFFICE VISIT (OUTPATIENT)
Dept: PRIMARY CARE | Facility: CLINIC | Age: 55
End: 2025-01-22
Payer: COMMERCIAL

## 2025-01-22 ENCOUNTER — PHARMACY VISIT (OUTPATIENT)
Dept: PHARMACY | Facility: CLINIC | Age: 55
End: 2025-01-22
Payer: MEDICAID

## 2025-01-22 VITALS
SYSTOLIC BLOOD PRESSURE: 134 MMHG | BODY MASS INDEX: 43.4 KG/M2 | DIASTOLIC BLOOD PRESSURE: 72 MMHG | WEIGHT: 310 LBS | HEIGHT: 71 IN

## 2025-01-22 DIAGNOSIS — L03.213 PERIORBITAL CELLULITIS, UNSPECIFIED LATERALITY: Primary | ICD-10-CM

## 2025-01-22 DIAGNOSIS — R21 RASH DUE TO ALLERGY: ICD-10-CM

## 2025-01-22 DIAGNOSIS — R21 RASH: ICD-10-CM

## 2025-01-22 DIAGNOSIS — Z13.220 LIPID SCREENING: ICD-10-CM

## 2025-01-22 DIAGNOSIS — M79.641 PAIN OF RIGHT HAND: ICD-10-CM

## 2025-01-22 DIAGNOSIS — R05.9 COUGH, UNSPECIFIED TYPE: ICD-10-CM

## 2025-01-22 DIAGNOSIS — T78.40XA RASH DUE TO ALLERGY: ICD-10-CM

## 2025-01-22 DIAGNOSIS — R53.83 OTHER FATIGUE: ICD-10-CM

## 2025-01-22 PROCEDURE — 3008F BODY MASS INDEX DOCD: CPT | Performed by: INTERNAL MEDICINE

## 2025-01-22 PROCEDURE — RXMED WILLOW AMBULATORY MEDICATION CHARGE

## 2025-01-22 PROCEDURE — 99214 OFFICE O/P EST MOD 30 MIN: CPT | Performed by: INTERNAL MEDICINE

## 2025-01-22 RX ORDER — PREDNISONE 10 MG/1
TABLET ORAL
Qty: 18 TABLET | Refills: 0 | Status: SHIPPED | OUTPATIENT
Start: 2025-01-22 | End: 2025-01-31

## 2025-01-22 RX ORDER — DOXYCYCLINE 100 MG/1
100 CAPSULE ORAL 2 TIMES DAILY
Qty: 20 CAPSULE | Refills: 0 | Status: SHIPPED | OUTPATIENT
Start: 2025-01-22 | End: 2025-02-01

## 2025-01-22 RX ORDER — GUAIFENESIN 100 MG/5ML
200 SOLUTION ORAL 3 TIMES DAILY PRN
Qty: 120 ML | Refills: 0 | Status: SHIPPED | OUTPATIENT
Start: 2025-01-22 | End: 2025-02-01

## 2025-01-22 RX ORDER — CIPROFLOXACIN 500 MG/1
500 TABLET ORAL 2 TIMES DAILY
Qty: 20 TABLET | Refills: 0 | Status: SHIPPED | OUTPATIENT
Start: 2025-01-22 | End: 2025-02-01

## 2025-01-22 RX ORDER — LORATADINE 10 MG/1
10 TABLET ORAL DAILY
Qty: 30 TABLET | Refills: 2 | Status: SHIPPED | OUTPATIENT
Start: 2025-01-22 | End: 2025-04-22

## 2025-01-22 ASSESSMENT — ENCOUNTER SYMPTOMS
OCCASIONAL FEELINGS OF UNSTEADINESS: 0
DEPRESSION: 0
LOSS OF SENSATION IN FEET: 0

## 2025-01-23 NOTE — PROGRESS NOTES
"Subjective   Patient ID: Valdez Madera is a 54 y.o. male who presents for Rash.    This 54-year-old young man today came here for multiple medical issues.  Both eyes around periorbital area red, inflamed looks like inflamed, itchy.  He went to the urgent care.  He was given steroid, he got better and the whole thing came back again.  He does not think he has sinus congestion and postnasal drip.  He came here for follow-up.  He has never had this problem.  No insect bite. No fall or trauma.    I have personally reviewed the patient's Past Medical History, Medications, Allergies, Social History, and Family History in the EMR.    Review of Systems   All other systems reviewed and are negative.    Objective   /72   Ht 1.803 m (5' 11\")   Wt 141 kg (310 lb)   BMI 43.24 kg/m²     Physical Exam  Vitals reviewed.   Cardiovascular:      Heart sounds: Normal heart sounds, S1 normal and S2 normal. No murmur heard.     No friction rub.   Pulmonary:      Effort: Pulmonary effort is normal.      Breath sounds: Normal breath sounds and air entry.   Abdominal:      Palpations: There is no hepatomegaly, splenomegaly or mass.   Musculoskeletal:      Right lower leg: No edema.      Left lower leg: No edema.   Lymphadenopathy:      Lower Body: No right inguinal adenopathy. No left inguinal adenopathy.   Skin:     Comments: FACE:  Periorbital area pretty symmetrical rash involving his both eye lids, upper and lower.  Eyeball okay.  Eyeball movements okay.   Neurological:      Cranial Nerves: Cranial nerves 2-12 are intact.      Sensory: No sensory deficit.      Motor: Motor function is intact.      Deep Tendon Reflexes: Reflexes are normal and symmetric.     LAB WORK:  Laboratory testing discussed.    Assessment/Plan   Problem List Items Addressed This Visit    None  Visit Diagnoses         Codes    Periorbital cellulitis, unspecified laterality    -  Primary L03.213    Rash     R21    Relevant Medications    ciprofloxacin " (Cipro) 500 mg tablet    doxycycline (Vibramycin) 100 mg capsule    predniSONE (Deltasone) 10 mg tablet    loratadine (Claritin) 10 mg tablet    Other Relevant Orders    Referral to Dermatology    Other fatigue     R53.83    Relevant Orders    CBC    Thyroid Stimulating Hormone    Urinalysis with Reflex Microscopic    Lipid screening     Z13.220    Relevant Orders    Comprehensive Metabolic Panel    Lipid Panel    Pain of right hand     M79.641    Relevant Orders    Arthritis Panel (CMS)    Cough, unspecified type     R05.9    Relevant Medications    guaiFENesin (Robitussin) 100 mg/5 mL syrup    Rash due to allergy     T78.40XA, R21        1. Periorbital infection probably allergic rash.  I doubt it is lupus.  I ordered blood work.  Empirically to treat the infection, I added Cipro, doxy, Claritin, and Robitussin.  If this periorbital rash not improving, I will refer to dermatologist for evaluation and treatment.  2. Allergic rash.  Responded to steroid, given.  3. Follow-up appointment in a couple of weeks.  4. Continue to follow.    Frank Attestation  By signing my name below, I, Frank Hameed attest that this documentation has been prepared under the direction and in the presence of Barb Spivey MD.   All medical record entries made by the frank were personally dictated by me I have reviewed the chart and agree the record accurately reflects my personal performance of his history physical examination and management

## 2025-01-27 PROCEDURE — RXMED WILLOW AMBULATORY MEDICATION CHARGE

## 2025-01-28 ENCOUNTER — PHARMACY VISIT (OUTPATIENT)
Dept: PHARMACY | Facility: CLINIC | Age: 55
End: 2025-01-28
Payer: MEDICAID

## 2025-02-08 ENCOUNTER — PHARMACY VISIT (OUTPATIENT)
Dept: PHARMACY | Facility: CLINIC | Age: 55
End: 2025-02-08
Payer: MEDICAID

## 2025-02-08 PROCEDURE — RXMED WILLOW AMBULATORY MEDICATION CHARGE

## 2025-02-26 ENCOUNTER — PHARMACY VISIT (OUTPATIENT)
Dept: PHARMACY | Facility: CLINIC | Age: 55
End: 2025-02-26
Payer: MEDICAID

## 2025-02-26 PROCEDURE — RXMED WILLOW AMBULATORY MEDICATION CHARGE

## 2025-03-06 ENCOUNTER — PHARMACY VISIT (OUTPATIENT)
Dept: PHARMACY | Facility: CLINIC | Age: 55
End: 2025-03-06
Payer: MEDICAID

## 2025-03-06 ENCOUNTER — APPOINTMENT (OUTPATIENT)
Dept: DERMATOLOGY | Facility: CLINIC | Age: 55
End: 2025-03-06
Payer: COMMERCIAL

## 2025-03-06 PROCEDURE — RXMED WILLOW AMBULATORY MEDICATION CHARGE

## 2025-03-06 PROCEDURE — RXOTC WILLOW AMBULATORY OTC CHARGE

## 2025-03-06 RX ORDER — TACROLIMUS 1 MG/G
OINTMENT TOPICAL
Qty: 30 G | Refills: 3 | OUTPATIENT
Start: 2025-03-06

## 2025-03-25 DIAGNOSIS — M79.641 PAIN OF RIGHT HAND: ICD-10-CM

## 2025-03-25 DIAGNOSIS — M25.531 RIGHT WRIST PAIN: ICD-10-CM

## 2025-03-25 RX ORDER — COLCHICINE 0.6 MG/1
0.6 TABLET ORAL 2 TIMES DAILY
Qty: 180 TABLET | Refills: 0 | Status: SHIPPED | OUTPATIENT
Start: 2025-03-25 | End: 2025-09-21

## 2025-03-26 PROCEDURE — RXMED WILLOW AMBULATORY MEDICATION CHARGE

## 2025-03-27 ENCOUNTER — PHARMACY VISIT (OUTPATIENT)
Dept: PHARMACY | Facility: CLINIC | Age: 55
End: 2025-03-27
Payer: MEDICAID

## 2025-04-24 ENCOUNTER — PHARMACY VISIT (OUTPATIENT)
Dept: PHARMACY | Facility: CLINIC | Age: 55
End: 2025-04-24
Payer: MEDICAID

## 2025-04-24 PROCEDURE — RXMED WILLOW AMBULATORY MEDICATION CHARGE

## 2025-05-06 ENCOUNTER — PHARMACY VISIT (OUTPATIENT)
Dept: PHARMACY | Facility: CLINIC | Age: 55
End: 2025-05-06
Payer: MEDICAID

## 2025-05-06 PROCEDURE — RXMED WILLOW AMBULATORY MEDICATION CHARGE

## 2025-05-27 ENCOUNTER — PHARMACY VISIT (OUTPATIENT)
Dept: PHARMACY | Facility: CLINIC | Age: 55
End: 2025-05-27
Payer: MEDICAID

## 2025-05-27 PROCEDURE — RXMED WILLOW AMBULATORY MEDICATION CHARGE

## 2025-06-19 DIAGNOSIS — M79.641 PAIN OF RIGHT HAND: ICD-10-CM

## 2025-06-19 DIAGNOSIS — M25.531 RIGHT WRIST PAIN: ICD-10-CM

## 2025-06-19 RX ORDER — COLCHICINE 0.6 MG/1
0.6 TABLET ORAL 2 TIMES DAILY
Qty: 180 TABLET | Refills: 0 | Status: SHIPPED | OUTPATIENT
Start: 2025-06-19 | End: 2025-12-16

## 2025-06-20 PROCEDURE — RXMED WILLOW AMBULATORY MEDICATION CHARGE

## 2025-06-24 ENCOUNTER — TELEPHONE (OUTPATIENT)
Dept: CARDIOLOGY | Facility: HOSPITAL | Age: 55
End: 2025-06-24
Payer: COMMERCIAL

## 2025-06-24 DIAGNOSIS — M79.89 LEG SWELLING: Primary | ICD-10-CM

## 2025-06-25 ENCOUNTER — HOSPITAL ENCOUNTER (OUTPATIENT)
Dept: VASCULAR MEDICINE | Facility: HOSPITAL | Age: 55
Discharge: HOME | End: 2025-06-25
Payer: COMMERCIAL

## 2025-06-25 ENCOUNTER — TELEPHONE (OUTPATIENT)
Dept: CARDIOLOGY | Facility: CLINIC | Age: 55
End: 2025-06-25

## 2025-06-25 DIAGNOSIS — M79.89 LEG SWELLING: ICD-10-CM

## 2025-06-25 PROCEDURE — 93970 EXTREMITY STUDY: CPT | Performed by: SURGERY

## 2025-06-25 PROCEDURE — 93970 EXTREMITY STUDY: CPT

## 2025-06-25 NOTE — TELEPHONE ENCOUNTER
----- Message from Denise Lopez sent at 6/25/2025  4:42 PM EDT -----  US completed with no new blood clots noted   There is cyst in the  right popliteal fossa, please follow up with your PCP/ortho in that regards   Follow up with Dr. Melvina Granado as scheduled   ----- Message -----  From: Karl, Syngo - Cardiology Results In  Sent: 6/25/2025   4:23 PM EDT  To: Denise Lopez MD     Telephone Encounter by Madyson Galvez MD at 02/20/18 01:49 PM     Author:  Madyson Galvez MD Service:  (none) Author Type:  Physician     Filed:  02/20/18 01:50 PM Encounter Date:  2/20/2018 Status:  Signed     :  Madyson Galvez MD (Physician)            Ok  See other message also[TB1.1M]      Revision History        User Key Date/Time User Provider Type Action    > TB1.1 02/20/18 01:50 PM Madyson Galvez MD Physician Sign    M - Manual

## 2025-06-26 ENCOUNTER — PHARMACY VISIT (OUTPATIENT)
Dept: PHARMACY | Facility: CLINIC | Age: 55
End: 2025-06-26
Payer: MEDICAID

## 2025-06-26 NOTE — TELEPHONE ENCOUNTER
Patient returned call, gave instructions per Dr. Lopez regarding US results patient verbalized understanding .  US completed with no new blood clots noted   There is cyst in the  right popliteal fossa, please follow up with your PCP/ortho in that regards   Follow up with Dr. Melvina Granado as scheduled

## 2025-06-26 NOTE — TELEPHONE ENCOUNTER
----- Message from Denise Lopez sent at 6/25/2025  4:42 PM EDT -----  US completed with no new blood clots noted   There is cyst in the  right popliteal fossa, please follow up with your PCP/ortho in that regards   Follow up with Dr. Melvina Granado as scheduled   ----- Message -----  From: Karl, Syngo - Cardiology Results In  Sent: 6/25/2025   4:23 PM EDT  To: Denise Lopez MD

## 2025-07-24 ENCOUNTER — PHARMACY VISIT (OUTPATIENT)
Dept: PHARMACY | Facility: CLINIC | Age: 55
End: 2025-07-24
Payer: COMMERCIAL

## 2025-07-24 PROCEDURE — RXOTC WILLOW AMBULATORY OTC CHARGE

## 2025-07-24 PROCEDURE — RXMED WILLOW AMBULATORY MEDICATION CHARGE

## 2025-08-03 DIAGNOSIS — R21 RASH: ICD-10-CM

## 2025-08-04 PROCEDURE — RXMED WILLOW AMBULATORY MEDICATION CHARGE

## 2025-08-04 RX ORDER — LORATADINE 10 MG/1
10 TABLET ORAL DAILY
Qty: 30 TABLET | Refills: 0 | Status: SHIPPED | OUTPATIENT
Start: 2025-08-04 | End: 2025-09-06

## 2025-08-07 ENCOUNTER — PHARMACY VISIT (OUTPATIENT)
Dept: PHARMACY | Facility: CLINIC | Age: 55
End: 2025-08-07
Payer: MEDICAID

## 2025-08-08 DIAGNOSIS — I82.432 ACUTE DEEP VEIN THROMBOSIS (DVT) OF POPLITEAL VEIN OF LEFT LOWER EXTREMITY: ICD-10-CM

## 2025-08-27 ENCOUNTER — OFFICE VISIT (OUTPATIENT)
Dept: CARDIOLOGY | Facility: HOSPITAL | Age: 55
End: 2025-08-27
Payer: COMMERCIAL

## 2025-08-27 ENCOUNTER — PHARMACY VISIT (OUTPATIENT)
Dept: PHARMACY | Facility: CLINIC | Age: 55
End: 2025-08-27
Payer: MEDICAID

## 2025-08-27 VITALS
DIASTOLIC BLOOD PRESSURE: 84 MMHG | HEART RATE: 117 BPM | OXYGEN SATURATION: 94 % | WEIGHT: 315 LBS | SYSTOLIC BLOOD PRESSURE: 127 MMHG | BODY MASS INDEX: 44.12 KG/M2

## 2025-08-27 DIAGNOSIS — I82.432 ACUTE DEEP VEIN THROMBOSIS (DVT) OF POPLITEAL VEIN OF LEFT LOWER EXTREMITY: ICD-10-CM

## 2025-08-27 PROCEDURE — 99212 OFFICE O/P EST SF 10 MIN: CPT

## 2025-08-27 PROCEDURE — RXMED WILLOW AMBULATORY MEDICATION CHARGE

## 2025-08-27 PROCEDURE — 1036F TOBACCO NON-USER: CPT | Performed by: INTERNAL MEDICINE

## 2025-08-27 PROCEDURE — 99213 OFFICE O/P EST LOW 20 MIN: CPT | Performed by: INTERNAL MEDICINE

## 2025-09-02 PROCEDURE — RXMED WILLOW AMBULATORY MEDICATION CHARGE

## 2025-09-04 ENCOUNTER — PHARMACY VISIT (OUTPATIENT)
Dept: PHARMACY | Facility: CLINIC | Age: 55
End: 2025-09-04
Payer: MEDICAID